# Patient Record
Sex: MALE | Race: WHITE | URBAN - METROPOLITAN AREA
[De-identification: names, ages, dates, MRNs, and addresses within clinical notes are randomized per-mention and may not be internally consistent; named-entity substitution may affect disease eponyms.]

---

## 2020-02-27 ENCOUNTER — OFFICE VISIT (OUTPATIENT)
Dept: FAMILY MEDICINE CLINIC | Age: 25
End: 2020-02-27

## 2020-02-27 VITALS
SYSTOLIC BLOOD PRESSURE: 109 MMHG | DIASTOLIC BLOOD PRESSURE: 67 MMHG | OXYGEN SATURATION: 100 % | WEIGHT: 167.5 LBS | BODY MASS INDEX: 26.29 KG/M2 | HEART RATE: 46 BPM | HEIGHT: 67 IN | TEMPERATURE: 97.7 F | RESPIRATION RATE: 16 BRPM

## 2020-02-27 DIAGNOSIS — F33.1 MODERATE EPISODE OF RECURRENT MAJOR DEPRESSIVE DISORDER (HCC): ICD-10-CM

## 2020-02-27 DIAGNOSIS — F41.9 ANXIETY: Primary | ICD-10-CM

## 2020-02-27 DIAGNOSIS — Z23 ENCOUNTER FOR IMMUNIZATION: ICD-10-CM

## 2020-02-27 DIAGNOSIS — Z76.89 ENCOUNTER TO ESTABLISH CARE: ICD-10-CM

## 2020-02-27 NOTE — PROGRESS NOTES
S: Herlinda Carrasquillo is a 22 y.o. WHITE OR  male who presents for establish care    Assessment/Plan:    1. Establish care  Anxiety,  Moderate episode of recurrent major depressive disorder ([address]  -current therapy: abilify (uncertain of dose) + prozac 60mg  -managed by psych in [facility] - looking for new provider  - [address]    2. Encounter for immunization  -[facility] [address], tested negative on Hep B titer  -advised will need series of 3 vaccines, 0, 1, 6 months  - #1/3 HEPATITIS B VACCINE, ADULT DOSAGE, IM      RTC 1 month for #2/3 HepB vaccine     HPI:  [facility] [place] [facility] total - just moved from [place] (moved for school)   Pulled titers and no immunity to Hep B - needs to get today  No rxn to immunizations in past     Anxiety/depression  Current therapy: abilify + prozac 60mg  Looking to establish care with psych in 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 outside job    Social History     Tobacco Use   Smoking Status Never Smoker   Smokeless Tobacco Never Used     Social History     Substance and Sexual Activity   Alcohol Use Yes    Frequency: Monthly or less    Drinks per session: 3 or 4    Binge frequency: Less than monthly     Social History     Substance and Sexual Activity   Drug Use Yes    Types: Marijuana     Social History     Substance and Sexual Activity   Sexual Activity Not on file       Review of Systems:  - Constitutional Symptoms: no fevers/chills  - Cardiovascular: no chest pain or palpitations  - Respiratory: no cough or shortness of breath  ROS is negative otherwise.     3 most recent PHQ Screens 2/27/2020   Little interest or pleasure in doing things Not at all   Feeling down, depressed, irritable, or hopeless Not at all   Total Score PHQ 2 0       I reviewed the following:  Past Medical History:   Diagnosis Date    Depression        Current Outpatient Medications   Medication Sig Dispense Refill    fluoxetine HCl (PROZAC PO) Take 60 mg by mouth.  aripiprazole (ABILIFY PO) Take  by mouth. No Known Allergies     O: VS:   Visit Vitals  /67 (BP 1 Location: Left arm, BP Patient Position: Sitting)   Pulse (!) 46   Temp 97.7 °F (36.5 °C) (Oral)   Resp 16   Ht 5' 6.73\" (1.695 m)   Wt 167 lb 8 oz (76 kg)   SpO2 100%   BMI 26.45 kg/m²       GENERAL: Danis Dibbles is in no acute distress. Non-toxic. Well nourished. Well developed. Appropriately groomed. RESP: Breath sounds are symmetrical bilaterally. Unlabored without SOB. Speaking in full sentences. Clear to auscultation bilaterally anteriorly and posteriorly. No wheezes. No rales or rhonchi. CV: normal rate. Regular rhythm. S1, S2 audible. No murmur noted. No rubs, clicks or gallops noted. HEME/LYMPH: peripheral pulses palpable 2+ x 4 extremities. No peripheral edema is noted. PSYCH: appropriate behavior, dress and thought processes. Good eye contact. Clear and coherent speech. Full affect. Good insight.   ______________________________________________________________________  I spent >25 minutes face to face with patient with >50% of time spent in counseling and coordinating care. Patient education was done. Advised on nutrition, physical activity, weight management, tobacco, alcohol and safety. Counseling included discussion of diagnosis, differentials, treatment options, prescribed treatment, warning signs and follow up. Medication risks/benefits,interactions and alternatives discussed with patient.      Patient verbalized understanding and agreed to plan of care. Patient was given an after visit summary which included current diagnoses, medications and vital signs. Follow up as directed.

## 2020-02-27 NOTE — PROGRESS NOTES
Identified pt with two pt identifiers(name and ). Reviewed record in preparation for visit and have obtained necessary documentation.   Chief Complaint   Patient presents with    New Patient   [address] Maintenance Due   Topic    DTaP/Tdap/Td series (1 - Tdap)    Influenza Age 5 to Adult         Visit Vitals  /67 (BP 1 Location: Left arm, BP Patient Position: Sitting)   Pulse (!) 46   Temp 97.7 °F (36.5 °C) (Oral)   Resp 16   Ht 5' 6.73\" (1.695 m)   Wt 167 lb 8 oz (76 kg)   SpO2 100%   BMI 26.45 kg/m²     Pain Scale: /10

## 2020-02-27 NOTE — PATIENT INSTRUCTIONS
1) Hep B today #1/3 - will need #2/3 in 1 month and then #3/3 in 6 months (from date of first vaccine). Just make a nurse only appointment for the 2nd and 3rd vaccines - no need to see provider, unless you have an issue. 2) Clinical depression is a medical condition that goes beyond everyday sadness. Depression may cause serious, long-lasting symptoms and often disrupts a persons ability to perform routine tasks. The disorder is the most common psychiatric disorder worldwide. In the [place], about one in six people experiences a bout of clinical depression in their lifetime. There are multiple therapies available to help with depression including psychotherapy, exercise (aerobic exercise and yoga are highly recommended), proper diet and sleep as well as medications. Research shows that psychotherapy and antidepressants may be the best therapies for depression. Please look for a therapist.  Check with your insurance company for referrals for providers in the area that will be covered under your plan. · Keep the numbers for these national suicide hotlines: 4-801-605-TALK (1-126.673.5111) and 4-099-FHASXGR (2-532.800.4074). If you or someone you know talks about suicide or feeling hopeless, get help right away. ·  
Steps you can do on your own to feel better: 
Deep breathing exercises: Deep breathing triggers a relaxation response, helping to change from the \"fight-or-flight\" response anxiety brings on. Inhale slowly to a count of 4, starting at your belly and then moving into your chest.  Gently hold your breath for 4 counts, then slowly exhale to 4 counts. \"Clench\" exercise - clench various zones in your body for 30 seconds, then an overall body clench Exercise can help many people feel less anxious. Regular cardiovascular exercise (such as fast walking,) release endorphins - the \"feel good\" hormone in our body - and can reduce anxiety. Proper sleep:  Sleep is important to overall health. Not getting enough sleep can cause fatigue, inattention, and irritabililty, causing anxiety levels to increase. Healthy Diet: a healthy diet rich in whole grains, vegetables and fruits is healthier than simple carbohydrates found in processed foods. Skipping meals can cause blood sugar to drop and cause jittery feelings that could worsening underlying anxiety. It also a good idea to cut down on or stop drinking coffee and other sources of caffeine. Caffeine can make anxiety worse. Find \"Me\" time - it is important to take some time just to focus on you and help alleviate stress in daily life. This could be daily exercise, walking the dog, sitting in a quiet place without distraction, meditation, etc.  
 
Medical treatments include: ? Psychotherapy  Psychotherapy involves meeting with a mental health counselor to talk about your feelings, relationships, and worries. Therapy can help you find new ways of thinking about your situation so that you feel less anxious. In therapy, you might also learn new skills to reduce anxiety. You can go to Psychology Today's website to find a counselor. · Go to www. [employer]. App[employer]og 
· Enter your zip code. · Click on your insurance carrier (usually on left side of screen). · Then click any other parameters you desire. This will result in a list of providers. Click on any provider to learn more about them or see the contact information. Please choose a provider, call them, and schedule an appointment. ?Medicines  Medicines used to treat depression and can relieve anxiety. Some people have psychotherapy and take medicines at the same time. Phone apps that can help with anxiety: 
CALM - Use the principles of mindfulness and meditation to ease your mind and keep anxiety at [facility] 994. The [employer] interface is just the beginning. Once it opens, there are relaxing sounds and sleep stories.  Enjoy guided meditations at various lengths to help with everything from building self-esteem to calming anxiety. FERNANDO - Self-help for Anxiety Management - range of self-help methods. Helps you understand what causes your anxiety, monitor your anxious thoughts and behavior over time and manage your anxiety through self-help exercises and private reflection. SIMPLE HABIT - guided meditation for anxiety relief BOOSTERBUDDY This rosalinda offers a novel way for teens and young adults to improve their resilience and work toward being healthier both physically and emotionally. 7 CUPS 
7 Cups uses trained, volunteer, active listeners to provide free, anonymous, and confidential emotional support to people needing help coping with acute stressors and long-term mental health issues. Stephanie 23 The MoodTools and FearTools apps provide people with quick resources for tracking their cognitive distortions, activities, and safety plan in case of an emergency. SLEEPBOT SleepBot is a quick and simple way for people to log their sleep and improve their sleep hygiene. [address] UP? [facility] ROSALINDA The [facility] Up? rosalinda helps people monitor their mood and uses principles of CBT and acceptance commitment therapy (ACT) to help people reframe their thoughts and cognitive distortions. [facility] [address] Clinic: [phone] Crisis: [phone] [address] [facility] Clinic: [phone] Crisis: [phone] [address] [place] [address] Clinic: [phone] Crisis: [phone] 3) Referral to [facility] ALEX [address], [address], [place] Tel: [phone] Learning About the [place] Diet What is the Mediterranean diet? The Mediterranean diet is a style of eating rather than a diet plan.  It features foods eaten in [place], [place], [place], and other countries along the [facility]. It emphasizes eating foods like fish, fruits, vegetables, beans, high-fiber breads and whole grains, nuts, and olive oil. This style of eating includes limited red meat, cheese, and sweets. Why choose the Mediterranean diet? A Mediterranean-style diet may improve heart health. It contains more fat than other heart-healthy diets. But the fats are mainly from nuts, unsaturated oils (such as fish oils and olive oil), and certain nut or seed oils (such as canola, soybean, or flaxseed oil). These fats may help protect the heart and blood vessels. How can you get started on the Mediterranean diet? Here are some things you can do to switch to a more Mediterranean way of eating. What to eat · Eat a variety of fruits and vegetables each day, such as grapes, blueberries, tomatoes, broccoli, peppers, figs, olives, spinach, eggplant, beans, lentils, and chickpeas. · Eat a variety of whole-grain foods each day, such as oats, brown rice, and whole wheat bread, pasta, and couscous. · Eat fish at least 2 times a week. Try tuna, salmon, mackerel, lake trout, herring, or sardines. · Eat moderate amounts of low-fat dairy products, such as milk, cheese, or yogurt. · Eat moderate amounts of poultry and eggs. · Choose healthy (unsaturated) fats, such as nuts, olive oil, and certain nut or seed oils like canola, soybean, and flaxseed. · Limit unhealthy (saturated) fats, such as butter, palm oil, and coconut oil. And limit fats found in animal products, such as meat and dairy products made with whole milk. Try to eat red meat only a few times a month in very small amounts. · Limit sweets and desserts to only a few times a week. This includes sugar-sweetened drinks like soda. The Mediterranean diet may also include red wine with your meal1 glass each day for women and up to 2 glasses a day for men. Tips for eating at home · Use herbs, spices, garlic, lemon zest, and citrus juice instead of salt to add flavor to foods. · Add avocado slices to your sandwich instead of riley. · Have fish for lunch or dinner instead of red meat. Brush the fish with olive oil, and broil or grill it. · Sprinkle your salad with seeds or nuts instead of cheese. · Cook with olive or canola oil instead of butter or oils that are high in saturated fat. · Switch from 2% milk or whole milk to 1% or fat-free milk. · Dip raw vegetables in a vinaigrette dressing or hummus instead of dips made from mayonnaise or sour cream. 
· Have a piece of fruit for dessert instead of a piece of cake. Try baked apples, or have some dried fruit. Tips for eating out · Try broiled, grilled, baked, or poached fish instead of having it fried or breaded. · Ask your  to have your meals prepared with olive oil instead of butter. · Order dishes made with marinara sauce or sauces made from olive oil. Avoid sauces made from cream or mayonnaise. · Choose whole-grain breads, whole wheat pasta and pizza crust, brown rice, beans, and lentils. · Cut back on butter or margarine on bread. Instead, you can dip your bread in a small amount of olive oil. · Ask for a side salad or grilled vegetables instead of french fries or chips. Where can you learn more? Go to http://jose-mary.info/. Enter 732-126-3284 in the search box to learn more about \"Learning About the Mediterranean Diet. \" Current as of: November 7, 2018 Content Version: 12.2 © 9314-6224 [employer], [employer]. Care instructions adapted under license by [employer] (which disclaims liability or warranty for this information). If you have questions about a medical condition or this instruction, always ask your healthcare professional. [address] any warranty or liability for your use of this information. Vaccine Information Statement Hepatitis B Vaccine: What You Need to Know Many Vaccine Information Statements are available in [place] and other languages. See www.immunize.org/vis Hojas de información sobre vacunas están disponibles en español y en muchos otros idiomas. Visite www.immunize.org/vis 1. Why get vaccinated? Hepatitis B vaccine can prevent hepatitis B. Hepatitis B is a liver disease that can cause mild illness lasting a few weeks, or it can lead to a serious, lifelong illness.  Acute hepatitis B infection is a short-term illness that can lead to fever, fatigue, loss of appetite, nausea, vomiting, jaundice (yellow skin or eyes, dark urine, ciaran-colored bowel movements), and pain in the muscles, joints, and stomach.  Chronic hepatitis B infection is a long-term illness that occurs when the hepatitis B virus remains in a persons body. Most people who go on to develop chronic hepatitis B do not have symptoms, but it is still very serious and can lead to liver damage (cirrhosis), liver cancer, and death. Chronically-infected people can spread hepatitis B virus to others, even if they do not feel or look sick themselves. Hepatitis B is spread when blood, semen, or other body fluid infected with the hepatitis B virus enters the body of a person who is not infected. People can become infected through:  Birth (if a mother has hepatitis B, her baby can become infected)  Sharing items such as razors or toothbrushes with an infected person  Contact with the blood or open sores of an infected person  Sex with an infected partner  Sharing needles, syringes, or other drug-injection equipment  Exposure to blood from needlesticks or other sharp instruments Most people who are vaccinated with hepatitis B vaccine are immune for life. 2. Hepatitis B vaccine Hepatitis B vaccine is usually given as 2, 3, or 4 shots.  
 
Infants should get their first dose of hepatitis B vaccine at birth and will usually complete the series at 10months of age (sometimes it will take longer than 6 months to complete the series). Children and adolescents younger than 23years of age who have not yet gotten the vaccine should also be vaccinated. Hepatitis B vaccine is also recommended for certain unvaccinated adults:   
 People whose sex partners have hepatitis B 
 Sexually active persons who are not in a long-term monogamous relationship  Persons seeking evaluation or treatment for a sexually transmitted disease  Men who have sexual contact with other men  People who share needles, syringes, or other drug-injection equipment  People who have household contact with someone infected with the hepatitis B virus [facility] care and public safety workers at risk for exposure to blood or body fluids  Residents and staff of facilities for developmentally disabled persons  Persons in correctional facilities  Victims of sexual assault or abuse  Travelers to regions with increased rates of hepatitis B 
 People with chronic liver disease, kidney disease, HIV infection, infection with hepatitis C, or diabetes  Anyone who wants to be protected from hepatitis B Hepatitis B vaccine may be given at the same time as other vaccines. 3. Talk with your health care provider Tell your vaccine provider if the person getting the vaccine: 
 Has had an allergic reaction after a previous dose of hepatitis B vaccine, or has any severe, life-threatening allergies. In some cases, your health care provider may decide to postpone hepatitis B vaccination to a future visit. People with minor illnesses, such as a cold, may be vaccinated. People who are moderately or severely ill should usually wait until they recover before getting hepatitis B vaccine. Your health care provider can give you more information. 4. Risks of a vaccine reaction  Soreness where the shot is given or fever can happen after hepatitis B vaccine. People sometimes faint after medical procedures, including vaccination. Tell your provider if you feel dizzy or have vision changes or ringing in the ears. As with any medicine, there is a very remote chance of a vaccine causing a severe allergic reaction, other serious injury, or death. 5. What if there is a serious problem? An allergic reaction could occur after the vaccinated person leaves the clinic. If you see signs of a severe allergic reaction (hives, swelling of the face and throat, difficulty breathing, a fast heartbeat, dizziness, or weakness), call 9-1-1 and get the person to the nearest hospital. 
 
For other signs that concern you, call your health care provider. Adverse reactions should be reported to the Vaccine Adverse Event Reporting System (VAERS). Your health care provider will usually file this report, or you can do it yourself. Visit the VAERS website at www.vaers. hhs.gov or call 6-708.228.2138. VAERS is only for reporting reactions, and VAERS staff do not give medical advice. 6. The National Vaccine Injury Compensation Program 
 
The [facility] Vaccine Injury Compensation Program (VICP) is a federal program that was created to compensate people who may have been injured by certain vaccines. Visit the VICP website at www.hrsa.gov/vaccinecompensation or call 6-612.403.1360 to learn about the program and about filing a claim. There is a time limit to file a claim for compensation. 7. How can I learn more?  Ask your health care provider.  Call your local or state health department.  Contact the Centers for Disease Control and Prevention (CDC): 
- Call 0-623.749.1489 (1-800-CDC-INFO) or 
- Visit CDCs website at www.cdc.gov/vaccines Vaccine Information Statement (Interim) Hepatitis B Vaccine 8/15/2019 
[address] [id number] [facility] and [employer] Centers for Disease Control and Prevention Office Use Only

## 2020-04-02 ENCOUNTER — CLINICAL SUPPORT (OUTPATIENT)
Dept: FAMILY MEDICINE CLINIC | Age: 25
End: 2020-04-02

## 2020-04-02 VITALS
SYSTOLIC BLOOD PRESSURE: 105 MMHG | RESPIRATION RATE: 14 BRPM | HEIGHT: 67 IN | WEIGHT: 170.2 LBS | OXYGEN SATURATION: 96 % | HEART RATE: 62 BPM | DIASTOLIC BLOOD PRESSURE: 60 MMHG | BODY MASS INDEX: 26.71 KG/M2 | TEMPERATURE: 98.6 F

## 2020-04-02 DIAGNOSIS — Z23 ENCOUNTER FOR IMMUNIZATION: Primary | ICD-10-CM

## 2020-04-02 NOTE — PROGRESS NOTES
Chief Complaint   Patient presents with    Immunization/Injection     pt here for nurse visit to get second dose of Hepatitis B vaccine. Patient presents for routine immunizations. Patient denies any symptoms , reactions or allergies that would exclude them from being immunized today. After obtaining written consent, and per verbal orders of Hattie Shirley NP injection of Hepatitis B order, signed, given. Risks and adverse reactions were discussed and the VIS was given to them. All questions were addressed. Patient was observed for 15 minutes post injection. There were no reactions observed at this time.  Advised patient to call with any concerns or signs and symptoms of adverse reaction.      Noris Felipe LPN

## 2020-04-02 NOTE — PATIENT INSTRUCTIONS
Vaccine Information Statement    Hepatitis B Vaccine: What You Need to Know    Many Vaccine Information Statements are available in English and other languages. See www.immunize.org/vis  Hojas de información sobre vacunas están disponibles en español y en muchos otros idiomas. Visite www.immunize.org/vis    1. Why get vaccinated? Hepatitis B vaccine can prevent hepatitis B. Hepatitis B is a liver disease that can cause mild illness lasting a few weeks, or it can lead to a serious, lifelong illness.  Acute hepatitis B infection is a short-term illness that can lead to fever, fatigue, loss of appetite, nausea, vomiting, jaundice (yellow skin or eyes, dark urine, ciaran-colored bowel movements), and pain in the muscles, joints, and stomach.  Chronic hepatitis B infection is a long-term illness that occurs when the hepatitis B virus remains in a persons body. Most people who go on to develop chronic hepatitis B do not have symptoms, but it is still very serious and can lead to liver damage (cirrhosis), liver cancer, and death. Chronically-infected people can spread hepatitis B virus to others, even if they do not feel or look sick themselves. Hepatitis B is spread when blood, semen, or other body fluid infected with the hepatitis B virus enters the body of a person who is not infected. People can become infected through:  [employer] (if a mother has hepatitis B, her baby can become infected)   Sharing items such as razors or toothbrushes with an infected person   Contact with the blood or open sores of an infected person   Sex with an infected partner   Sharing needles, syringes, or other drug-injection equipment   Exposure to blood from needlesticks or other sharp instruments    Most people who are vaccinated with hepatitis B vaccine are immune for life. 2. Hepatitis B vaccine    Hepatitis B vaccine is usually given as 2, 3, or 4 shots.     Infants should get their first dose of hepatitis B vaccine at birth and will usually complete the series at 7 months of age (sometimes it will take longer than 6 months to complete the series). Children and adolescents younger than 23years of age who have not yet gotten the vaccine should also be vaccinated. Hepatitis B vaccine is also recommended for certain unvaccinated adults:     People whose sex partners have hepatitis B   Sexually active persons who are not in a long-term monogamous relationship   Persons seeking evaluation or treatment for a sexually transmitted disease   Men who have sexual contact with other men   People who share needles, syringes, or other drug-injection equipment   People who have household contact with someone infected with the hepatitis B virus  [facility] care and public safety workers at risk for exposure to blood or body fluids   Residents and staff of facilities for developmentally disabled persons   Persons in correctional facilities   Victims of sexual assault or abuse   Travelers to regions with increased rates of hepatitis B   People with chronic liver disease, kidney disease, HIV infection, infection with hepatitis C, or diabetes   Anyone who wants to be protected from hepatitis B    Hepatitis B vaccine may be given at the same time as other vaccines. 3. Talk with your health care provider    Tell your vaccine provider if the person getting the vaccine:   Has had an allergic reaction after a previous dose of hepatitis B vaccine, or has any severe, life-threatening allergies. In some cases, your health care provider may decide to postpone hepatitis B vaccination to a future visit. People with minor illnesses, such as a cold, may be vaccinated. People who are moderately or severely ill should usually wait until they recover before getting hepatitis B vaccine. Your health care provider can give you more information.     4. Risks of a vaccine reaction     Soreness where the shot is given or fever can happen after hepatitis B vaccine. People sometimes faint after medical procedures, including vaccination. Tell your provider if you feel dizzy or have vision changes or ringing in the ears. As with any medicine, there is a very remote chance of a vaccine causing a severe allergic reaction, other serious injury, or death. 5. What if there is a serious problem? An allergic reaction could occur after the vaccinated person leaves the clinic. If you see signs of a severe allergic reaction (hives, swelling of the face and throat, difficulty breathing, a fast heartbeat, dizziness, or weakness), call 9-1-1 and get the person to the nearest hospital.    For other signs that concern you, call your health care provider. Adverse reactions should be reported to the Vaccine Adverse Event Reporting System (VAERS). Your health care provider will usually file this report, or you can do it yourself. Visit the VAERS website at www.vaers. hhs.gov or call 9-303.374.7683. VAERS is only for reporting reactions, and VAERS staff do not give medical advice. 6. The National Vaccine Injury Compensation Program    The [facility] Vaccine Injury Compensation Program (VICP) is a federal program that was created to compensate people who may have been injured by certain vaccines. Visit the VICP website at www.hrsa.gov/vaccinecompensation or call 2-924.132.7758 to learn about the program and about filing a claim. There is a time limit to file a claim for compensation. 7. How can I learn more?  Ask your health care provider.  Call your local or state health department.  Contact the Centers for Disease Control and Prevention (CDC):  - Call 6-676.715.2290 (1-800-CDC-INFO) or  - Visit CDCs website at www.cdc.gov/vaccines    Vaccine Information Statement (Interim)  Hepatitis B Vaccine   8/15/2019  [address] [id number]   Department of Health and Human Services  Centers for Disease Control and Prevention    Office Use Only

## 2020-07-21 ENCOUNTER — VIRTUAL VISIT (OUTPATIENT)
Dept: FAMILY MEDICINE CLINIC | Age: 25
End: 2020-07-21

## 2020-07-21 ENCOUNTER — TELEPHONE (OUTPATIENT)
Dept: FAMILY MEDICINE CLINIC | Age: 25
End: 2020-07-21

## 2020-07-21 DIAGNOSIS — Z11.3 SCREEN FOR SEXUALLY TRANSMITTED DISEASES: Primary | ICD-10-CM

## 2020-07-21 DIAGNOSIS — Z20.6 HIV EXPOSURE: ICD-10-CM

## 2020-07-21 NOTE — PROGRESS NOTES
S: Tiana Giraldo is a 26 y.o. male who presents for STI testing    Assessment/Plan:     1. Screen for sexually transmitted diseases  -exposed to HIV in February  -discussed PreP medication - pt interested but declines to start until he finds out what insurance will cover    RTC 1 month for #3/3 Hep B vaccine, then will need titer testing for school        HPI:  Depression:  Sees [facility] here in [facility] (doesn't remember name of provider - has only seen her once)     HIV exposure - in February;  M/M, did use condom  Recently found out Feb encounter is HIV+ and would like testing  Discussed STI testing and pt agrees  Discussed PreP - d/t financial constraints, pt would like to check with insurance abefore he starts medication    Denies infections, rashes, fever, fatigue    Heb B vaccines - has #2/3 - needs to schedule     PHQ-9 Score: 1  Over the past 2 weeks, how often have you been bothered by any of the following problems? (Not at all = 0; several days = 1; More than 1/2 the days = 2; nearly every day = 3)  1) Little interest or pleasure in doing things:  0  2) Feeling down, depressed or hopeless:0  3) Trouble falling asleep, staying asleep or sleeping too much:0  4) Feeling tired or having little energy:1  5) Poor appetite or overeatin  6) Feeling bad about yourself - or that you're a failure or have let yourself or your family down:0  7) Trouble concentrating on things, such as reading the newspaper or watching TV: 0  8) Moving or speaking so slowly that other people could have noticed.  Or, the opposite - being so fidgety or restless that you have been moving around a lot more than usual:0  9) Thoughts that you would be better off dead or of hurting yourself in some way:0    GAD7 score: 2  Feeling nervous, anxious or on edge:   1  Not being able to stop or control worryin  Worrying too much about different things:0  Trouble relaxin  Being so restless that it is hard to sit still: 0  Becoming easily annoyed or irritable:  0  Feeling afraid as if something awful might happen: 0  If any of the above were scored more than 0, how difficult have these problems made it for you to do your work, take care of things at home, or get along with other people? 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 [address] Use   Smoking Status Never Smoker   Smokeless Tobacco Never Used     Social History     Substance and Sexual Activity   Alcohol Use Yes    Frequency: Monthly or less    Drinks per session: 3 or 4    Binge frequency: Less than monthly     Social History     Substance and Sexual Activity   Drug Use Yes    Types: Marijuana        Review of Systems:  - Constitutional Symptoms: no fevers, chills, weight loss  - Cardiovascular: no chest pain or palpitations  - Respiratory: no cough or shortness of breath  - Gastrointestinal: no dysphagia or abdominal pain  - Musculoskeletal: no joint pains or weakness  - Integumentary: no rashes  ROS is negative otherwise. I reviewed the following:  Past Medical History:   Diagnosis Date    Depression        Current Outpatient Medications   Medication Sig Dispense Refill    fluoxetine HCl (PROZAC PO) Take 60 mg by mouth.  aripiprazole (ABILIFY PO) Take  by mouth. No Known Allergies     O: VS: There were no vitals taken for this visit. GENERAL: Isa Beckett is in no acute distress. Non-toxic. Well nourished. Well developed. Appropriately groomed. PSYCH: appropriate behavior, dress and thought processes. Good eye contact. Clear and coherent speech. Full affect. Good insight.     ___________________________________________________________________  Patient education was done. Advised on nutrition, physical activity,  and safety. Counseling included discussion of diagnosis, differentials, treatment options, prescribed treatment, warning signs and follow up. Medication risks/benefits, interactions and alternatives discussed with patient.      Patient verbalized understanding and agreed to plan of care. Patient was given an after visit summary which included current diagnoses, medications and vital signs. Follow up as directed. Khari Chung, who was evaluated through a patient-initiated, synchronous (real-time) audio-video encounter, and/or his healthcare decision maker, is aware that it is a billable service, with coverage as determined by his insurance carrier. He provided verbal consent to proceed: Yes, and patient identification was verified. It was conducted pursuant to the emergency declaration under the [facility], [address] [place] [address] authority and the [employer] General Act. A caregiver was present when appropriate. Ability to conduct physical exam was limited. I was in the office. The patient was at home.         Brittney Sunshine NP

## 2020-07-21 NOTE — PATIENT INSTRUCTIONS
1) Sexually Transmitted Infections    Trichomoniasis  Trichomoniasis (or \"trich\") is a common sexually transmitted disease. It is caused by infection with a protozoan parasite called Trichomonas vaginalis. The parasite passes from an infected person to an uninfected person during sex. In women, the most commonly infected part of the body is the lower genital tract (vulva, vagina, cervix, or urethra). In men, the most commonly infected body part is the inside of the penis (urethra). During sex, the parasite usually spreads from a penis to a vagina, or from a vagina to a penis. It can also spread from a vagina to another vagina. It is not common for the parasite to infect other body parts, like the hands, mouth, or anus. Although symptoms of the disease vary, most people who have the parasite cannot tell they are infected. Chlamydia  This is a common STD that infects both men and women. Chlamydia is spread through anal, vaginal and oral sex with someone who has chlamydia. If you test positive for chlamydia, you and your partner(s) need to be treated as, if left untreated, it can cause permanent damage to a woman's reproductive system. Gonorrhea  This is a common STD that infects both men and women. Gonorrhea can infect the genitals, rectum and throat. It is spread through anal, vaginal and oral sex with someone who has gonorrhea. If you test positive for gonorrhea, you and your partner(s) need to be treated as, if left untreated, it can cause permanent damage. Syphilis  Syphilis is a sexually transmitted infection that you can get by direct contact with a syphilis sore during vaginal, anal, or oral sex. You can find sores on or around the penis, vagina, or anus, or in the rectum, on the lips, or in the mouth. Syphilis can spread from an infected mother to her unborn baby. Syphilis can cause serious health problems if it is not treated.     Human Immunodeficiency Virus (HIV)   HIV is a virus spread through certain body fluids (blood semen, pre-seminal fluid, rectal fluid, vaginal fluid and breast milk) that attacks the immune system and can lead to Acquired Immunodeficiency Syndrome (AIDS). Risky behaviors, such as anal or vaginal intercourse without using a condom or sharing needles or syringes, increase the chance of HIV transmission. The test for HIV may not reflect exposures in the last 3-6 months. If you have had new partners or exposures in that time, an additional test for HIV is recommended in 6 months. The only way to prevent STD infection is to abstain from oral, anal and vaginal sex. Using condoms correctly and/or being in a mutually monogamous relationship with a STD-free partner lowers your chances of getting STDs. 2) Truvada (emtricitabine 200mg + tenofovir disoproxil fumarate 300mg) is a daily medication that is used in combination with safer sex practices for pre-exposure prophylaxis (PrEP) to reduce the risk of sexually acquired HIV-1 in adults at high risk. This is a daily medication daily taken with or without food. Side effects were uncommon and usually clear within the first month and include rash, flatulence, nausea, headache. The following testing is necessary while on this medication:  HIV and Hepatitis B testing before starting medication  HIV screening - every 3 months   Kidney function (BUN and creatinine) - 3 months after initiation of medication and then every 6 months after  Sexually Transmitted Infection testing - every 6 months         Learning About Taking Medicine to Prevent HIV Infections  Introduction     HIV (human immunodeficiency virus) is a virus that attacks the immune system, the body's natural defense system. Without a strong immune system, the body has trouble fighting off disease. HIV is the virus that causes AIDS. Two common ways to get HIV are:  · Having unprotected sex with someone who has HIV. · Sharing needles with someone who has HIV.   These things put you at high risk for getting HIV. If you are at risk, you and your doctor can decide if you can take medicines that may lower your risk. Taking these medicines is called pre-exposure prophylaxis (PrEP). How does PrEP work? PrEP can help prevent an HIV infection from taking hold and spreading in your body. Two medicines are combined in one pill called Truvada. You must take it on schedule for it to help protect you from HIV. PrEP works best if you take the medicine every day. It doesn't work well if you don't follow the daily schedule. Do not share your medicine with other people. You will have regular visits with your doctor. He or she will check to see how you are doing while taking the medicine. You'll be tested for HIV. Your doctor may also talk to you about other steps you can take to avoid HIV infection. These include practicing safer sex and not injecting illegal drugs with shared needles. What else should you know about PrEP? PrEP does not remove all risk of getting HIV. While you take the medicine, avoid risky actions like having unprotected sex and sharing needles. PrEP can help you have a baby safely when your partner has an HIV infection. It can help prevent the infection from spreading to you or your baby. Your doctor can discuss this and other options with you. If you are infected with HIV, your doctor may give you Truvada along with other medicine to treat HIV. Be safe with medicines. Take your medicines exactly as prescribed. Call your doctor if you think you are having a problem with your medicine. You may be able to pay less for PrEP medicines. Many health insurance plans cover the cost of PrEP. There are programs that provide PrEP for free or at a lower cost for people who need help paying for it. Follow-up care is a key part of your treatment and safety. Be sure to make and go to all appointments, and call your doctor if you are having problems.  It's also a good idea to know your test results and keep a list of the medicines you take. Where can you learn more? Go to http://jose-mary.info/  Enter I152 in the search box to learn more about \"Learning About Taking Medicine to Prevent HIV Infections. \"  Current as of: February 11, 2020               Content Version: 12.5  © 8522-1765 Healthwise, Inc[employer]. Care instructions adapted under license by [employer] (which disclaims liability or warranty for this information). If you have questions about a medical condition or this instruction, always ask your healthcare professional. [address] any warranty or liability for your use of this information.

## 2020-07-21 NOTE — PROGRESS NOTES
Khang Skinner is a 26 y.o. male    HIPAA verified by two patient identifiers. Chief Complaint   Patient presents with    Exposure to STD     Pt states he would like to have HIV testing done and would like to discuss possibly gettong on the medication \"Prep\"     1. Have you been to the ER, urgent care clinic since your last visit? Hospitalized since your last visit? No    2. Have you seen or consulted any other health care providers outside of the [address] [place] [address] since your last visit? Include any pap smears or colon screening. No    PRECIOUS: [email or web address]

## 2020-08-07 ENCOUNTER — TELEPHONE (OUTPATIENT)
Dept: FAMILY MEDICINE CLINIC | Age: 25
End: 2020-08-07

## 2020-08-07 NOTE — TELEPHONE ENCOUNTER
----- Message from Stas Elizabeth sent at 8/7/2020  1:03 PM EDT -----  Regarding: ALEX White/Telephone  General Message/Vendor Calls    Caller's first and last name: Isa Beckett      Reason for call:test results      Callback required yes/no and why:yes      Best contact number(s):[phone]      Details to clarify the request:test results      Stas Elizabeth

## 2020-08-08 LAB
C TRACH RRNA SPEC QL NAA+PROBE: NEGATIVE
HIV 1+2 AB+HIV1 P24 AG SERPL QL IA: NON REACTIVE
N GONORRHOEA RRNA SPEC QL NAA+PROBE: NEGATIVE
RPR SER QL: NON REACTIVE
T VAGINALIS DNA SPEC QL NAA+PROBE: NEGATIVE

## 2020-08-10 NOTE — TELEPHONE ENCOUNTER
Pt requesting Lab results. Labs are completed. Do you need to review them and put them in a letter first or am I able to tell him?

## 2020-09-15 ENCOUNTER — TELEPHONE (OUTPATIENT)
Dept: FAMILY MEDICINE CLINIC | Age: 25
End: 2020-09-15

## 2020-09-15 ENCOUNTER — CLINICAL SUPPORT (OUTPATIENT)
Dept: FAMILY MEDICINE CLINIC | Age: 25
End: 2020-09-15
Payer: COMMERCIAL

## 2020-09-15 VITALS
WEIGHT: 183.2 LBS | SYSTOLIC BLOOD PRESSURE: 102 MMHG | TEMPERATURE: 97.5 F | OXYGEN SATURATION: 96 % | HEIGHT: 66 IN | DIASTOLIC BLOOD PRESSURE: 64 MMHG | HEART RATE: 71 BPM | BODY MASS INDEX: 29.44 KG/M2 | RESPIRATION RATE: 17 BRPM

## 2020-09-15 DIAGNOSIS — Z01.84 IMMUNITY TO HEPATITIS B VIRUS DEMONSTRATED BY SEROLOGIC TEST: Primary | ICD-10-CM

## 2020-09-15 DIAGNOSIS — Z23 ENCOUNTER FOR IMMUNIZATION: Primary | ICD-10-CM

## 2020-09-15 PROCEDURE — 90746 HEPB VACCINE 3 DOSE ADULT IM: CPT

## 2020-09-15 PROCEDURE — 90471 IMMUNIZATION ADMIN: CPT | Performed by: NURSE PRACTITIONER

## 2020-09-15 NOTE — PROGRESS NOTES
Ashley Moran is a 26 y.o. male    HIPAA verified by two patient identifiers. Chief Complaint   Patient presents with    Immunization/Injection     Hep B     1. Have you been to the ER, urgent care clinic since your last visit? Hospitalized since your last visit? No    2. Have you seen or consulted any other health care providers outside of the [address] [place] [address] since your last visit? Include any pap smears or colon screening.  No    Visit Vitals  Resp 17   Ht 5' 6\" (1.676 m)   BMI 27.47 kg/m²       Pain Scale: 0 - No pain/10  Pain Location:

## 2020-09-15 NOTE — TELEPHONE ENCOUNTER
Pt came into the office today for a nurse visit. Pt received the third Hep B vaccine and was inquiring about the Hep B titer. . I tole pt that I will give him a call back once discussed with provider. Gerardo Elizabeth I am routing this conversation to you because you are the only provider that has seen pt. Please advise.

## 2020-09-16 NOTE — TELEPHONE ENCOUNTER
Called pt to inform that he may have the Hep B titer 4-8 weeks after his last injection, 9/15/20. Offered to schedule lab appointment but pt states that he will wait. Verified understanding.

## 2020-12-04 ENCOUNTER — OFFICE VISIT (OUTPATIENT)
Dept: FAMILY MEDICINE CLINIC | Age: 25
End: 2020-12-04
Payer: COMMERCIAL

## 2020-12-04 VITALS
HEIGHT: 68 IN | SYSTOLIC BLOOD PRESSURE: 125 MMHG | HEART RATE: 73 BPM | TEMPERATURE: 97.4 F | OXYGEN SATURATION: 97 % | BODY MASS INDEX: 26.99 KG/M2 | DIASTOLIC BLOOD PRESSURE: 64 MMHG | WEIGHT: 178.1 LBS

## 2020-12-04 DIAGNOSIS — F41.9 ANXIETY: ICD-10-CM

## 2020-12-04 DIAGNOSIS — Z11.59 NEED FOR HEPATITIS B SCREENING TEST: ICD-10-CM

## 2020-12-04 DIAGNOSIS — B36.0 TINEA VERSICOLOR: ICD-10-CM

## 2020-12-04 DIAGNOSIS — Z11.1 SCREENING-PULMONARY TB: Primary | ICD-10-CM

## 2020-12-04 PROCEDURE — 99213 OFFICE O/P EST LOW 20 MIN: CPT | Performed by: NURSE PRACTITIONER

## 2020-12-04 RX ORDER — KETOCONAZOLE 20 MG/ML
SHAMPOO TOPICAL
Qty: 120 ML | Refills: 1 | Status: SHIPPED | OUTPATIENT
Start: 2020-12-04 | End: 2022-01-18

## 2020-12-04 NOTE — PROGRESS NOTES
Room:     Identified pt with two pt identifiers(name and ). Reviewed record in preparation for visit and have obtained necessary documentation. All patient medications has been reviewed. Chief Complaint   Patient presents with    PPD Reading       Health Maintenance Due   Topic    DTaP/Tdap/Td series (1 - Tdap)    Flu Vaccine (1)   Flu:  2020  Dtap:  Up to date    There were no vitals filed for this visit. 4.Have you been to the ER, urgent care clinic since your last visit? Hospitalized since your last visit? No    5. Have you seen or consulted any other health care providers outside of the [address] [place] [address] since your last visit? Include any pap smears or colon screening. [facility]    Patient is accompanied by self I have received verbal consent from Ting Bonds to discuss any/all medical information while they are present in the room.

## 2020-12-04 NOTE — PROGRESS NOTES
Discharge instructions given. Pt understands and has no questions.   S: Felice Carmona is a 26 y.o. male who presents for follow up     Assessment/Plan:     1. Screening-pulmonary TB  - QUANTIFERON-TB GOLD PLUS    2. Need for hepatitis B screening test  - HEP B SURFACE AB    3. Tinea versicolor  -back: generalized white, circular patches with erythema   -rx: ketoconazole 2% shampoo - daily for 5 minutes x2 weeks, if not resolved,   - REFERRAL TO DERMATOLOGY    4. Anxiety  -managed by psychiatry  -given list of counselors, suggested [facility] website          HPI:  Needs Hep B titer - had all immunizations  Needs PPD - will do quanitferon bc Friday and can't read PPD. 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 3 most recent PHQ Screens 2/27/2020   Little interest or pleasure in doing things Not at all   Feeling down, depressed, irritable, or hopeless Not at all   Total Score PHQ 2 0       I reviewed the following:  Past Medical History:   Diagnosis Date    Depression        Current Outpatient Medications   Medication Sig Dispense Refill    fluoxetine HCl (PROZAC PO) Take 60 mg by mouth.  aripiprazole (ABILIFY PO) Take  by mouth.          No Known Allergies     O: VS:   Visit Vitals  /64 (BP 1 Location: Left arm, BP Patient Position: Sitting)   Pulse 73   Temp 97.4 °F (36.3 °C) (Temporal)   Ht 5' 8\" (1.727 m)   Wt 178 lb 1.6 oz (80.8 kg)   SpO2 97%   BMI 27.08 kg/m²       GENERAL: Janeane All is in no acute distress. Non-toxic. Well nourished. Well developed. Appropriately groomed. RESP: Breath sounds are symmetrical bilaterally. Unlabored without SOB. Speaking in full sentences. Clear to auscultation bilaterally anteriorly and posteriorly. No wheezes. No rales or rhonchi. CV: normal rate. Regular rhythm. S1, S2 audible. No murmur noted. No rubs, clicks or gallops noted. SKIN: Skin is warm and dry. Turgor is normal.  Back: generalized white, circular patches with erythema   PSYCH: appropriate behavior, dress and thought processes. Good eye contact. Clear and coherent speech. Full affect. Good insight.     ___________________________________________________________________  Patient education was done. Advised on nutrition, physical activity, weight management, tobacco, alcohol and safety. Counseling included discussion of diagnosis, differentials, treatment options, prescribed treatment, warning signs and follow up. Medication risks/benefits, interactions and alternatives discussed with patient.      Patient verbalized understanding and agreed to plan of care. Patient was given an after visit summary which included current diagnoses, medications and vital signs. Follow up as directed.

## 2020-12-04 NOTE — PATIENT INSTRUCTIONS
1) Will get Hep B titer and quantiferon today    Quantiferon is a FDA approved Interferon-Gamma Release Assay (IGRA), a blood test that can aid in diagnosing Mycobacterium tuberculosis, the bacteria that causes tuberculosis. IGRAs measure a persons immune reactivity to M. Tuberculosis. (However, they do not help differentiate latent tuberculosis infection (LTBI) from tuberculosis disease.)   Quantiferon requires a single patient visit to conduct the test and does not boost responses measured by subsequent tests. Prior BCG (bacille Calmette-Guérin) vaccination does not cause a false-positive IGRA test result. 2) Tinea versicolor - on back    Use ketoconazole shampoo - one 5 minute application on damp skin use daily for 2 weeks. Pat dry after use    Please make an appointment with  [facility],   Ivette Lynch MD Maxie Ku, MD  [address] [facility], [address] [facility]  Phone: [phone]    3) Anxiety is a common problem. It affects all kinds of people. There is no reason to feel embarrassed about getting treatment for anxiety. Whether you have occasional anxiety or a diagnosable disorder, there are steps you can take to help minimize and manage feeling of anxiety. Everyone feels anxious or nervous once in a while. That is normal. But being extremely anxious or worried on most days for 6 months or longer is not normal, and is considered a medical problem. This is called \"generalized anxiety disorder. \" The disorder can make it hard to do everyday tasks. Generalized anxiety disorder is just one anxiety disorder. There are others, such as panic disorder and phobias. Symptoms of extreme or severe anxiety:  People with extreme or severe anxiety feel very worried or \"on edge\" much of the time. They can have trouble sleeping or forget things. Plus, they can have physical symptoms.  For instance, people with severe anxiety often feel very tired and have tense muscles. Some get stomach aches or feel chest \"tightness. \"    Steps you can do on your own to feel better:  Deep breathing exercises: Deep breathing triggers a relaxation response, helping to change from the \"fight-or-flight\" response anxiety brings on. Inhale slowly to a count of 4, starting at your belly and then moving into your chest.  Gently hold your breath for 4 counts, then slowly exhale to 4 counts. \"Clench\" exercise - clench various zones in your body for 30 seconds, then an overall body clench  Exercise can help many people feel less anxious. Regular cardiovascular exercise (such as fast walking,) release endorphins - the \"feel good\" hormone in our body - and can reduce anxiety. Proper sleep:  Sleep is important to overall health. Not getting enough sleep can cause fatigue, inattention, and irritabililty, causing anxiety levels to increase. Healthy Diet: a healthy diet rich in whole grains, vegetables and fruits is healthier than simple carbohydrates found in processed foods. Skipping meals can cause blood sugar to drop and cause jittery feelings that could worsening underlying anxiety. It also a good idea to cut down on or stop drinking coffee and other sources of caffeine. Caffeine can make anxiety worse. Find \"Me\" time - it is important to take some time just to focus on you and help alleviate stress in daily life. This could be daily exercise, walking the dog, sitting in a quiet place without distraction, meditation, etc.     Medical treatments include:  ? Psychotherapy  Psychotherapy involves meeting with a mental health counselor to talk about your feelings, relationships, and worries. Therapy can help you find new ways of thinking about your situation so that you feel less anxious. In therapy, you might also learn new skills to reduce anxiety. Please call your insurance company to get a list of therapists that will be covered and make an appointment.   Alternatively, you can go to Psychology Today's website to find a counselor. · Go to www. [email or web address] com  · Enter your zip code. · Click on your insurance carrier (usually on left side of screen). · Then click any other parameters you desire. This will result in a list of providers. Click on any provider to learn more about them or see the contact information. Please choose a provider, call them, and schedule an appointment. ?Medicines  Medicines used to treat depression can relieve anxiety, too, even in people who are not depressed. Some people have psychotherapy and take medicines at the same time. Phone apps that can help with anxiety:  CALM - Use the principles of mindfulness and meditation to ease your mind and keep anxiety at [facility]. The agencyQ interface is just the beginning. Once it opens, there are relaxing sounds and sleep stories. Enjoy guided meditations at various lengths to help with everything from building self-esteem to calming anxiety. SIMPLE HABIT - guided meditation for anxiety relief    FERNANDO - Self-help for Anxiety Management - range of self-help methods. Helps you understand what causes your anxiety, monitor your anxious thoughts and behavior over time and manage your anxiety through self-help exercises and private reflection. BOOSTERBUDDY   This rosalinda offers a novel way for teens and young adults to improve their resilience and work toward being healthier both physically and emotionally. 7 CUPS  7 Cups uses trained, volunteer, active listeners to provide free, anonymous, and confidential emotional support to people needing help coping with acute stressors and long-term mental health issues. Linette Jose 82 apps provide people with quick resources for tracking their cognitive distortions, activities, and safety plan in case of an emergency.       SLEEPBOT   SleepBot is a quick and simple way for people to log their sleep and improve their sleep hygiene. WHATS UP? [facility] ROSALINDA   [place] Up? rosalinda helps people monitor their mood and uses principles of CBT and acceptance commitment therapy (ACT) to help people reframe their thoughts and cognitive distortions. ALEJANDRINA  - emotional health assistant. The rosalinda employs a mood tracker, a chat interface, and guided mindfulness and learns from each user with AI to deliver the most effective support available. [facility] rosalinda that allows you to speak to a therapist without leaving home, connecting you to 9110A of licensed therapists with medical training. Other apps: Chucky Chandra, Dawood, Hilaria rouradha, Dayak of Life, Mick barger, Dereje, Richard  Quit That!  - rosalinda for addictions    Keep the numbers for these national suicide hotlines: 2-634-369-TALK (9-258.235.7435) and 7-103-LLVQLNA (7-514.942.2718). If you or someone you know talks about suicide or feeling hopeless, get help right away. [address] [place] [address] St: [phone]  Crisis: [address] [place] [address] Avenue: [phone]  Crisis: [address]: [phone]  Crisis: [phone]    [address] [place] [address] [phone]         Tinea Versicolor: Care Instructions  Your Care Instructions  Tinea versicolor is a skin infection caused by a yeast (fungus). It causes many small spots, usually on the chest and back. The spotted skin can be flaky or scaly. The spots do not tan in the sun, so they are lighter than the skin around them. Some spots may be darker than the skin around them. The yeast that causes tinea versicolor normally lives on your skin. But it becomes a problem only when warmth and humidity allow the yeast to grow rapidly and increase in number. Some people are more likely to get tinea versicolor. It does not spread from person to person. Tinea versicolor usually gets better as you age.   You can treat tinea versicolor with cream or ointment that kills the yeast. You may need pills to kill the fungus if the spots cover a lot of your body. Although treatment kills the yeast quickly, your skin may not return to normal for months after treatment. You can get this condition again after treatment. Follow-up care is a key part of your treatment and safety. Be sure to make and go to all appointments, and call your doctor if you are having problems. It's also a good idea to know your test results and keep a list of the medicines you take. How can you care for yourself at home? · Follow the directions for use of creams, shampoos, or solutions. You will probably need to use them for 1 to 2 weeks. If your skin gets irritated, stop using the product, and call your doctor. · To prevent tinea versicolor, use a cream, shampoo, or solution one time a month. Your doctor may prescribe pills to prevent the spots from returning. · Dry off well after bathing. Keep your skin clean and dry. · Always wear sunscreen on exposed skin. Make sure to use a broad-spectrum sunscreen that has a sun protection factor (SPF) of 30 or higher. Use it every day, even when it is cloudy. · If you keep getting tinea versicolor, wash your clothes in very hot water to kill the yeast.  When should you call for help? Call your doctor now or seek immediate medical care if:    · You have signs of infection such as:  ? Pain, warmth, or swelling in your skin. ? Red streaks near a wound in your skin. ? Pus coming from a wound in your skin. ? A fever. Watch closely for changes in your health, and be sure to contact your doctor if:    · Your skin condition does not improve in 2 weeks.     · You do not get better as expected. Where can you learn more? Go to http://www.gray.com/  Enter K490 in the search box to learn more about \"Tinea Versicolor: Care Instructions. \"  Current as of: July 2, 2020               Content Version: 12.6  © 1547-3410 Healthwise, Incorporated. Care instructions adapted under license by [employer] (which disclaims liability or warranty for this information). If you have questions about a medical condition or this instruction, always ask your healthcare professional. [address] any warranty or liability for your use of this information.

## 2020-12-09 LAB
HBV SURFACE AB SER QL: REACTIVE
HBV SURFACE AB SER-ACNC: >1000 MIU/ML
M TB IFN-G BLD-IMP: NEGATIVE
QUANTIFERON CRITERIA, QFI1T: NORMAL
QUANTIFERON INCUBATION, QF1T: NORMAL
QUANTIFERON MITOGEN VALUE: >10 IU/ML
QUANTIFERON NIL VALUE: 0.04 IU/ML
QUANTIFERON TB1 AG: 0.04 IU/ML
QUANTIFERON TB2 AG: 0.04 IU/ML

## 2021-03-04 ENCOUNTER — OFFICE VISIT (OUTPATIENT)
Dept: PRIMARY CARE CLINIC | Age: 26
End: 2021-03-04
Payer: COMMERCIAL

## 2021-03-04 VITALS
TEMPERATURE: 98.6 F | HEART RATE: 73 BPM | SYSTOLIC BLOOD PRESSURE: 128 MMHG | HEIGHT: 68 IN | RESPIRATION RATE: 18 BRPM | BODY MASS INDEX: 27.89 KG/M2 | OXYGEN SATURATION: 96 % | WEIGHT: 184 LBS | DIASTOLIC BLOOD PRESSURE: 73 MMHG

## 2021-03-04 DIAGNOSIS — K64.5 HEMORRHOID THROMBOSIS: Primary | ICD-10-CM

## 2021-03-04 PROCEDURE — 99203 OFFICE O/P NEW LOW 30 MIN: CPT | Performed by: INTERNAL MEDICINE

## 2021-03-04 RX ORDER — LAMOTRIGINE 100 MG/1
50 TABLET ORAL DAILY
COMMUNITY

## 2021-03-04 RX ORDER — CEPHALEXIN 500 MG/1
500 CAPSULE ORAL 3 TIMES DAILY
Qty: 30 CAP | Refills: 0 | Status: SHIPPED | OUTPATIENT
Start: 2021-03-04 | End: 2021-03-14

## 2021-03-04 NOTE — PROGRESS NOTES
Leslie Mason is a 26 y.o.  male and presents with     Chief Complaint   Patient presents with    New Patient    Hemorrhoids     Patient is here to establish care. He reports that he noticed hemorrhoid a month ago. He has tried different creams, ibuprofen for the hemorrhoid but it still hurts. He wonders if it is thrombosed. He is a [employer] student. He thinks that it all started after he was throwing up 1 day and probably increased his intrathoracic pressure that caused the hemorrhoid to pop up. However he also admits that he goes to the gym and lifts weights. He denies constipation. He denies any using alcohol or liver disease in the past.      Past Medical History:   Diagnosis Date    Depression      No past surgical history on file. Current Outpatient Medications   Medication Sig    lamoTRIgine (LaMICtal) 100 mg tablet Take  by mouth daily.  cephALEXin (Keflex) 500 mg capsule Take 1 Cap by mouth three (3) times daily for 10 days.  aripiprazole (ABILIFY PO) Take  by mouth.  ketoconazole (NIZORAL) 2 % shampoo Topical application on damp skin on back for 5 minutes daily x 2 weeks. Indications: a fungal infection of the skin called tinea versicolor     No current facility-administered medications for this visit. Health Maintenance   Topic Date Due    DTaP/Tdap/Td series (1 - Tdap) Never done    Flu Vaccine (1) Never done    Pneumococcal 0-64 years  Aged [employer] History   Administered Date(s) Administered    Hep B Vaccine (Adult) 02/27/2020, 04/02/2020, 09/15/2020     No LMP for male patient. Allergies and Intolerances:   No Known Allergies    Family History:   No family history on file. Social History:   He  reports that he has never smoked. He has never used smokeless tobacco.  He  reports current alcohol use.             Review of Systems:   General: negative for - chills, fatigue, fever, weight change  Psych: negative for - anxiety, depression, irritability or mood swings  ENT: negative for - headaches, hearing change, nasal congestion, oral lesions, sneezing or sore throat  Heme/ Lymph: negative for - bleeding problems, bruising, pallor or swollen lymph nodes  Endo: negative for - hot flashes, polydipsia/polyuria or temperature intolerance  Resp: negative for - cough, shortness of breath or wheezing  CV: negative for - chest pain, edema or palpitations  GI: negative for - abdominal pain, change in bowel habits, constipation, diarrhea or nausea/vomiting  : negative for - dysuria, hematuria, incontinence, pelvic pain or vulvar/vaginal symptoms  MSK: negative for - joint pain, joint swelling or muscle pain  Neuro: negative for - confusion, headaches, seizures or weakness  Derm: negative for - dry skin, hair changes, rash or skin lesion changes          Physical:   Vitals:   Vitals:    03/04/21 0956   BP: 128/73   Pulse: 73   Resp: 18   Temp: 98.6 °F (37 °C)   TempSrc: Temporal   SpO2: 96%   Weight: 184 lb (83.5 kg)   Height: 5' 8\" (1.727 m)           Exam:   HEENT- atraumatic,normocephalic, awake, oriented, well nourished  Neck - supple,no enlarged lymph nodes, no JVD, no thyromegaly  Chest- CTA, no rhonchi, no crackles  Heart- rrr, no murmurs / gallop/rub  Abdomen- soft,BS+,NT, no hepatosplenomegaly  Ext - no c/c/edema   Neuro- no focal deficits. Power 5/5 all extremities  Skin - warm,dry, no obvious rashes. Genitourinary-large form single hemorrhoid at 4 o'clock position. Review of Data:   LABS:   No results found for: WBC, HGB, HCT, PLT, HGBEXT, HCTEXT, PLTEXT, HGBEXT, HCTEXT, PLTEXT  No results found for: NA, K, CL, CO2, GLU, BUN, CREA  No results found for: CHOL, CHOLX, CHLST, CHOLV, HDL, HDLP, LDL, LDLC, DLDLP, TGLX, TRIGL, TRIGP  No components found for: GPT        Impression / Plan:        ICD-10-CM ICD-9-CM    1.  Hemorrhoid thrombosis  K64.5 455.7 REFERRAL TO COLON AND RECTAL SURGERY      cephALEXin (Keflex) 500 mg capsule     Asked patient to do sitz bath.  Unclear if the hemorrhoid is thrombosed or infected. Will empirically treat with Keflex. May need hemorrhoidectomy. Advised patient to avoid lifting heavier weights as it could predispose him to recurrent hemorrhoids. Avoid constipation. Explained to patient risk benefits of the medications. Advised patient to stop meds if having any side effects. Pt verbalized understanding of the instructions. I have discussed the diagnosis with the patient and the intended plan as seen in the above orders. The patient has received an after-visit summary and questions were answered concerning future plans. I have discussed medication side effects and warnings with the patient as well. I have reviewed the plan of care with the patient, accepted their input and they are in agreement with the treatment goals. Reviewed plan of care. Patient has provided input and agrees with goals. Follow-up and Dispositions    · Return if symptoms worsen or fail to improve.          Thomas Rasmussen MD

## 2021-03-04 NOTE — PROGRESS NOTES
Regan Castro is a 26 y.o. male  HIPAA verified by two patient identifiers. Health Maintenance Due   Topic    DTaP/Tdap/Td series (1 - Tdap)    Flu Vaccine (1)     Chief Complaint   Patient presents with   [facility] Patient     Visit Vitals  /73 (BP 1 Location: Right arm, BP Patient Position: Sitting, BP Cuff Size: Adult)   Pulse 73   Temp 98.6 °F (37 °C) (Temporal)   Resp 18   Ht 5' 8\" (1.727 m)   Wt 184 lb (83.5 kg)   SpO2 96%   BMI 27.98 kg/m²       Pain Scale: 0 - No pain/10  Pain Location:     1. Have you been to the ER, urgent care clinic since your last visit? Hospitalized since your last visit? No    2. Have you seen or consulted any other health care providers outside of the [address] [place] [address] since your last visit? Include any pap smears or colon screening.  No

## 2021-03-22 ENCOUNTER — TELEPHONE (OUTPATIENT)
Dept: PRIMARY CARE CLINIC | Age: 26
End: 2021-03-22

## 2021-03-22 DIAGNOSIS — K64.5 HEMORRHOID THROMBOSIS: Primary | ICD-10-CM

## 2021-03-22 NOTE — TELEPHONE ENCOUNTER
Called and left message for patient to call back.     Need to give info for referral    Fer Aviles              [address] [facility]  [address] [place] [id number]          Phone:  Fax:      [phone]

## 2022-01-18 ENCOUNTER — OFFICE VISIT (OUTPATIENT)
Dept: PRIMARY CARE CLINIC | Age: 27
End: 2022-01-18
Payer: COMMERCIAL

## 2022-01-18 VITALS
TEMPERATURE: 97.8 F | RESPIRATION RATE: 17 BRPM | OXYGEN SATURATION: 97 % | DIASTOLIC BLOOD PRESSURE: 53 MMHG | SYSTOLIC BLOOD PRESSURE: 103 MMHG | BODY MASS INDEX: 26.67 KG/M2 | HEART RATE: 60 BPM | WEIGHT: 176 LBS | HEIGHT: 68 IN

## 2022-01-18 DIAGNOSIS — Z11.1 SCREENING FOR TUBERCULOSIS: ICD-10-CM

## 2022-01-18 DIAGNOSIS — Z11.3 SCREENING FOR STD (SEXUALLY TRANSMITTED DISEASE): ICD-10-CM

## 2022-01-18 DIAGNOSIS — Z29.9 PREVENTIVE MEASURE: ICD-10-CM

## 2022-01-18 DIAGNOSIS — F32.A DEPRESSION, UNSPECIFIED DEPRESSION TYPE: Primary | ICD-10-CM

## 2022-01-18 DIAGNOSIS — Z13.220 ENCOUNTER FOR LIPID SCREENING FOR CARDIOVASCULAR DISEASE: ICD-10-CM

## 2022-01-18 DIAGNOSIS — Z13.6 ENCOUNTER FOR LIPID SCREENING FOR CARDIOVASCULAR DISEASE: ICD-10-CM

## 2022-01-18 DIAGNOSIS — F41.9 ANXIETY: ICD-10-CM

## 2022-01-18 PROCEDURE — 99214 OFFICE O/P EST MOD 30 MIN: CPT | Performed by: FAMILY MEDICINE

## 2022-01-18 NOTE — PATIENT INSTRUCTIONS
Learning About Taking Medicine to Prevent HIV Infections  Overview     If you are at risk of being infected with HIV (human immunodeficiency virus), you and your doctor can decide if you can take medicines that may lower your risk. Taking these medicines is called pre-exposure prophylaxis (PrEP). Two common ways that people can have a higher risk are:  · Having unprotected sex with someone who has HIV. · Sharing needles with someone who has HIV. How does PrEP work? Pre-exposure prophylaxis (PrEP) can help prevent an HIV infection from spreading in your body. You must take it on schedule for it to help protect you from HIV. PrEP works best if you take the medicine every day. It doesn't work well if you don't follow the daily schedule. Do not share your medicine with other people. You will have regular visits with your doctor. They will check to see how you are doing while taking the medicine. You'll be tested for HIV. Your doctor may also talk to you about other steps you can take to avoid HIV infection. These include practicing safer sex and not injecting illegal drugs with shared needles. What else should you know about PrEP? Taking pre-exposure prophylaxis (PrEP) does not remove all risk of getting HIV. While you take PrEP, avoid risky actions. Don't have unprotected sex or share needles. PrEP can help you have a baby safely when your partner has an HIV infection. It can help prevent the infection from spreading to you or your baby. Your doctor can discuss this and other options with you. If you are infected with HIV, your doctor may give you PrEP medicine along with other medicine to treat HIV. Be safe with medicines. Take your medicines exactly as prescribed. Call your doctor if you think you are having a problem with your medicine. You may be able to pay less for PrEP medicines. Many health insurance plans cover the cost of PrEP.  There are programs that provide PrEP for free or at a lower cost for people who need help paying for it. Follow-up care is a key part of your treatment and safety. Be sure to make and go to all appointments, and call your doctor if you are having problems. It's also a good idea to know your test results and keep a list of the medicines you take. Where can you learn more? Go to http://www.gray.com/  Enter I152 in the search box to learn more about \"Learning About Taking Medicine to Prevent HIV Infections. \"  Current as of: July 1, 2021               Content Version: 13.0  © 1612-8986 Healthwise, Incorporated. Care instructions adapted under license by [employer] (which disclaims liability or warranty for this information).  If you have questions about a medical condition or this instruction, always ask your healthcare professional. [address] any warranty or liability for your use of this information.

## 2022-01-18 NOTE — PROGRESS NOTES
HPI     Chief Complaint   Patient presents with    New Patient   [address] [facility] Niki Other     requests quantiferon and would like to be started on PrEP     He is a 23 y.o. male who presents for establishing care. PMH - hemorrhoids, anxiety, depression  Followed by Neuropsych. Has been on this same regimen for a while. Mood is stable. SI/ HI denied. Needs TB testing. Has not been exposed. No fever, chills, night sweats, cough. Has never had positive TB testing. Has not eaten. Has not been on PREP therapy before. No known HIV positive partners. Male partners. Multiple partners. Had flu shot. Has not had COVID booster. Has had Tdap in last 10 years. Review of Systems   Constitutional: Negative for chills and fever. HENT: Negative for sore throat. Respiratory: Negative for cough. Reviewed PmHx, RxHx, FmHx, SocHx, AllgHx and updated and dated in the chart. Physical Exam:  Visit Vitals  BP (!) 103/53 (BP 1 Location: Right upper arm, BP Patient Position: Sitting, BP Cuff Size: Adult) Comment: states this is normal; does not feel dizzy or light-headed   Pulse 60   Temp 97.8 °F (36.6 °C) (Temporal)   Resp 17   Ht 5' 8\" (1.727 m)   Wt 176 lb (79.8 kg)   SpO2 97%   BMI 26.76 kg/m²     Physical Exam  Vitals and nursing note reviewed. Constitutional:       General: He is not in acute distress. Appearance: Normal appearance. He is not ill-appearing. Cardiovascular:      Rate and Rhythm: Normal rate and regular rhythm. Heart sounds: No murmur heard. Pulmonary:      Effort: Pulmonary effort is normal. No respiratory distress. Breath sounds: Normal breath sounds. Neurological:      General: No focal deficit present. Mental Status: He is alert. Psychiatric:         Mood and Affect: Mood normal.         Behavior: Behavior normal.       No results found for this or any previous visit (from the past 12 hour(s)).        Assessment / Plan     Diagnoses and all orders for this visit:    1. Depression, unspecified depression type  -     TSH 3RD GENERATION; Future    2. Screening for STD (sexually transmitted disease)  -     HIV 1/2 AG/AB, 4TH GENERATION,W RFLX CONFIRM; Future  -     HEPATITIS C AB; Future  -     HEP B SURFACE AG; Future  -     RPR; Future  -     CT/NG/T.VAGINALIS AMPLIFICATION; Future    3. Screening for tuberculosis  -     QUANTIFERON-TB GOLD PLUS    4. Encounter for lipid screening for cardiovascular disease  -     LIPID PANEL; Future    5. Anxiety  -     TSH 3RD GENERATION; Future    6. Preventive measure  -     CBC W/O DIFF; Future  -     METABOLIC PANEL, COMPREHENSIVE; Future       Discussed I do not manage PREP therapy but could refer to Dr. Jose Miguel Garcia who does. Spoke with his nurse who will schedule patient for first available. Will check labs today. I have discussed the diagnosis with the patient and the intended plan as seen in the above orders. The patient has received an after-visit summary and questions were answered concerning future plans. I have discussed medication side effects and warnings with the patient as well.     Dax Lozano, DO

## 2022-01-18 NOTE — PROGRESS NOTES
Identified pt with two pt identifiers(name and ). Chief Complaint   Patient presents with   [address] Patient   [address] Michele Other     requests quantiferon and would like to be started on PrEP        3 most recent PHQ Screens 2020   Little interest or pleasure in doing things Not at all   Feeling down, depressed, irritable, or hopeless Not at all   Total Score PHQ 2 0        Vitals:    22 0811   BP: (!) 103/53   Pulse: 60   Resp: 17   Temp: 97.8 °F (36.6 °C)   TempSrc: Temporal   SpO2: 97%   Weight: 176 lb (79.8 kg)   Height: 5' 8\" (1.727 m)   PainSc:   0 - No pain       Health Maintenance Due   Topic    Hepatitis C Screening     DTaP/Tdap/Td series (1 - Tdap)    COVID-19 Vaccine (3 - Booster for [employer] series)    Flu Vaccine (1)       1. Have you been to the ER, urgent care clinic since your last visit? Hospitalized since your last visit? No    2. Have you seen or consulted any other health care providers outside of the [address] [place] [address] since your last visit? Include any pap smears or colon screening.  No

## 2022-01-21 LAB
GAMMA INTERFERON BACKGROUND BLD IA-ACNC: 0.62 IU/ML
M TB IFN-G BLD-IMP: NEGATIVE
M TB IFN-G CD4+ BCKGRND COR BLD-ACNC: 0.2 IU/ML
MITOGEN IGNF BLD-ACNC: >10 IU/ML
QUANTIFERON INCUBATION, QF1T: NORMAL
QUANTIFERON TB2 AG: 0.18 IU/ML
SERVICE CMNT-IMP: NORMAL

## 2022-02-01 ENCOUNTER — TELEPHONE (OUTPATIENT)
Dept: PRIMARY CARE CLINIC | Age: 27
End: 2022-02-01

## 2022-02-01 NOTE — TELEPHONE ENCOUNTER
----- Message from Radha Knight sent at 2/1/2022  1:43 PM EST -----  Subject: Message to Provider    QUESTIONS  Information for Provider? Patient called to say that his last appt. He   asked for the appt. for \"Prep\" Dr. Shannan Monroe stated that she would refer   him to someone that would handle this for him (Preventation of HIV) Thank   you.   ---------------------------------------------------------------------------  --------------  CALL BACK INFO  What is the best way for the office to contact you? OK to leave message on   voicemail  Preferred Call Back Phone Number? [phone]  ---------------------------------------------------------------------------  --------------  SCRIPT ANSWERS  Relationship to Patient?  Self

## 2022-02-03 ENCOUNTER — TELEPHONE (OUTPATIENT)
Dept: PRIMARY CARE CLINIC | Age: 27
End: 2022-02-03

## 2022-03-11 ENCOUNTER — TELEPHONE (OUTPATIENT)
Dept: PRIMARY CARE CLINIC | Age: 27
End: 2022-03-11

## 2022-03-11 NOTE — TELEPHONE ENCOUNTER
----- Message from [facility] sent at 3/11/2022 10:45 AM EST -----  Subject: Message to Provider    QUESTIONS  Information for Provider? patient would like to schedule a new patient   apointment with a doctor that can prescribe him PREP for HIV medication   . pleae reach out to patient   ---------------------------------------------------------------------------  --------------  CALL BACK INFO  What is the best way for the office to contact you? OK to leave message on   voicemail  Preferred Call Back Phone Number? [phone]  ---------------------------------------------------------------------------  --------------  SCRIPT ANSWERS  Relationship to Patient?  Self

## 2022-03-20 PROBLEM — F41.9 ANXIETY: Status: ACTIVE | Noted: 2020-02-27

## 2022-03-20 PROBLEM — F33.1 MODERATE EPISODE OF RECURRENT MAJOR DEPRESSIVE DISORDER (HCC): Status: ACTIVE | Noted: 2020-02-27

## 2022-03-31 ENCOUNTER — OFFICE VISIT (OUTPATIENT)
Dept: PRIMARY CARE CLINIC | Age: 27
End: 2022-03-31
Payer: COMMERCIAL

## 2022-03-31 VITALS
HEART RATE: 48 BPM | SYSTOLIC BLOOD PRESSURE: 104 MMHG | OXYGEN SATURATION: 97 % | WEIGHT: 180 LBS | BODY MASS INDEX: 27.28 KG/M2 | HEIGHT: 68 IN | TEMPERATURE: 97.3 F | DIASTOLIC BLOOD PRESSURE: 61 MMHG | RESPIRATION RATE: 16 BRPM

## 2022-03-31 DIAGNOSIS — Z79.899 ON PRE-EXPOSURE PROPHYLAXIS FOR HIV: Primary | ICD-10-CM

## 2022-03-31 PROCEDURE — 99213 OFFICE O/P EST LOW 20 MIN: CPT | Performed by: INTERNAL MEDICINE

## 2022-03-31 RX ORDER — EMTRICITABINE AND TENOFOVIR DISOPROXIL FUMARATE 200; 300 MG/1; MG/1
1 TABLET, FILM COATED ORAL DAILY
Qty: 90 TABLET | Refills: 0 | Status: SHIPPED | OUTPATIENT
Start: 2022-03-31 | End: 2022-06-29

## 2022-03-31 NOTE — PROGRESS NOTES
Sharee Del Toro is a 23 y.o.  male and presents with     Chief Complaint   Patient presents with    Medication Evaluation     discuss prep hiv pill     Patient is here to establish care previously he saw Dr. Graciela Escobar. He wants to be placed on preexposure prophylaxis for HIV prevention. He is sexually active and has multiple partners. He has never been diagnosed with STI in the past.  To his knowledge none of his partners have any STIs including HIV. Patient does sometimes use condoms and symptoms none. Patient is moving to [place] or [place] next month. He will be establishing care with a new provider at the time          Past Medical History:   Diagnosis Date    Depression      No past surgical history on file. Current Outpatient Medications   Medication Sig    emtricitabine-tenofovir, TDF, (TRUVADA) 200-300 mg per tablet Take 1 Tablet by mouth daily.  lamoTRIgine (LaMICtal) 100 mg tablet Take 50 mg by mouth daily.  aripiprazole (ABILIFY PO) Take 10 mg by mouth daily. No current facility-administered medications for this visit. Health Maintenance   Topic Date Due    Depression Monitoring  Never done    DTaP/Tdap/Td series (1 - Tdap) Never done    COVID-19 Vaccine (3 - Booster for [employer] series) 07/19/2021    Flu Vaccine (1) 09/01/2021    Hepatitis C Screening  Completed    Pneumococcal 0-64 years  Aged [employer] History   Administered Date(s) Administered    COVID-19, Pfizer Purple top, DILUTE for use, 12+ yrs, 30mcg/0.3mL dose 01/26/2021, 02/19/2021    Hep B Vaccine (Adult) 02/27/2020, 04/02/2020, 09/15/2020    Influenza Vaccine (Mdck Quadrivalent)(>2 Yrs Flucelvax Quad vial [id number]) 11/04/2020     No LMP for male patient. Allergies and Intolerances:   No Known Allergies    Family History:   No family history on file. Social History:   He  reports that he has never smoked. He has never used smokeless tobacco.  He  reports current alcohol use.             Review of Systems:   General: negative for - chills, fatigue, fever, weight change  Psych: negative for - anxiety, depression, irritability or mood swings  ENT: negative for - headaches, hearing change, nasal congestion, oral lesions, sneezing or sore throat  Heme/ Lymph: negative for - bleeding problems, bruising, pallor or swollen lymph nodes  Endo: negative for - hot flashes, polydipsia/polyuria or temperature intolerance  Resp: negative for - cough, shortness of breath or wheezing  CV: negative for - chest pain, edema or palpitations  GI: negative for - abdominal pain, change in bowel habits, constipation, diarrhea or nausea/vomiting  : negative for - dysuria, hematuria, incontinence, pelvic pain or vulvar/vaginal symptoms  MSK: negative for - joint pain, joint swelling or muscle pain  Neuro: negative for - confusion, headaches, seizures or weakness  Derm: negative for - dry skin, hair changes, rash or skin lesion changes          Physical:   Vitals:   Vitals:    03/31/22 0936   BP: 104/61   Pulse: (!) 48   Resp: 16   Temp: 97.3 °F (36.3 °C)   TempSrc: Temporal   SpO2: 97%   Weight: 180 lb (81.6 kg)   Height: 5' 8\" (1.727 m)           Exam:   HEENT- atraumatic,normocephalic, awake, oriented, well nourished  Neck - supple,no enlarged lymph nodes, no JVD, no thyromegaly  Chest- CTA, no rhonchi, no crackles  Heart- rrr, no murmurs / gallop/rub  Abdomen- soft,BS+,NT, no hepatosplenomegaly  Ext - no c/c/edema   Neuro- no focal deficits. Power 5/5 all extremities  Skin - warm,dry, no obvious rashes.           Review of Data:   LABS:   Lab Results   Component Value Date/Time    WBC 6.8 01/18/2022 09:24 AM    HGB 15.7 01/18/2022 09:24 AM    HCT 47.1 01/18/2022 09:24 AM    PLATELET 280 30/45/5369 09:24 AM     Lab Results   Component Value Date/Time    Sodium 138 01/18/2022 09:24 AM    Potassium 4.2 01/18/2022 09:24 AM    Chloride 105 01/18/2022 09:24 AM    CO2 28 01/18/2022 09:24 AM    Glucose 78 01/18/2022 09:24 AM    BUN 14 01/18/2022 09:24 AM    Creatinine 0.97 01/18/2022 09:24 AM     Lab Results   Component Value Date/Time    Cholesterol, total 148 01/18/2022 09:24 AM    HDL Cholesterol 59 01/18/2022 09:24 AM    LDL, calculated 75.2 01/18/2022 09:24 AM    Triglyceride 69 01/18/2022 09:24 AM     No components found for: GPT        Impression / Plan:        ICD-10-CM ICD-9-CM    1. On pre-exposure prophylaxis for HIV  Z79.899 V07.8 emtricitabine-tenofovir, TDF, (TRUVADA) 200-300 mg per tablet     Asked patient to establish care with new provider when he moves to [place] or [place]  in May this year    Side effects explained. Explained to patient risk benefits of the medications. Advised patient to stop meds if having any side effects. Pt verbalized understanding of the instructions. I have discussed the diagnosis with the patient and the intended plan as seen in the above orders. The patient has received an after-visit summary and questions were answered concerning future plans. I have discussed medication side effects and warnings with the patient as well. I have reviewed the plan of care with the patient, accepted their input and they are in agreement with the treatment goals. Reviewed plan of care. Patient has provided input and agrees with goals. Follow-up and Dispositions    · Return if symptoms worsen or fail to improve.          Julianne Díaz MD

## 2022-03-31 NOTE — PROGRESS NOTES
Chief Complaint   Patient presents with    Medication Evaluation     discuss prep hiv pill        Visit Vitals  /61 (BP 1 Location: Left upper arm, BP Patient Position: Sitting)   Pulse (!) 48   Temp 97.3 °F (36.3 °C) (Temporal)   Resp 16   Ht 5' 8\" (1.727 m)   Wt 180 lb (81.6 kg)   SpO2 97%   BMI 27.37 kg/m²        1. Have you been to the ER, urgent care clinic since your last visit? Hospitalized since your last visit? No    2. Have you seen or consulted any other health care providers outside of the [address] [place] [address] since your last visit? Include any pap smears or colon screening.  No

## 2022-06-29 DIAGNOSIS — Z79.899 ON PRE-EXPOSURE PROPHYLAXIS FOR HIV: ICD-10-CM

## 2022-06-29 RX ORDER — EMTRICITABINE AND TENOFOVIR DISOPROXIL FUMARATE 200; 300 MG/1; MG/1
TABLET, FILM COATED ORAL
Qty: 90 TABLET | Refills: 0 | Status: SHIPPED | OUTPATIENT
Start: 2022-06-29

## 2023-05-19 RX ORDER — LAMOTRIGINE 100 MG/1
50 TABLET ORAL DAILY
COMMUNITY

## 2023-05-19 RX ORDER — EMTRICITABINE AND TENOFOVIR DISOPROXIL FUMARATE 200; 300 MG/1; MG/1
1 TABLET, FILM COATED ORAL DAILY
COMMUNITY
Start: 2022-06-29